# Patient Record
Sex: MALE
[De-identification: names, ages, dates, MRNs, and addresses within clinical notes are randomized per-mention and may not be internally consistent; named-entity substitution may affect disease eponyms.]

---

## 2022-09-01 PROBLEM — Z00.00 ENCOUNTER FOR PREVENTIVE HEALTH EXAMINATION: Status: ACTIVE | Noted: 2022-09-01

## 2022-09-19 ENCOUNTER — APPOINTMENT (OUTPATIENT)
Dept: PHYSICAL MEDICINE AND REHAB | Facility: CLINIC | Age: 27
End: 2022-09-19

## 2022-09-19 VITALS
RESPIRATION RATE: 18 BRPM | DIASTOLIC BLOOD PRESSURE: 71 MMHG | HEIGHT: 71 IN | HEART RATE: 72 BPM | SYSTOLIC BLOOD PRESSURE: 111 MMHG | BODY MASS INDEX: 25.2 KG/M2 | WEIGHT: 180 LBS

## 2022-09-19 DIAGNOSIS — Z78.9 OTHER SPECIFIED HEALTH STATUS: ICD-10-CM

## 2022-09-19 DIAGNOSIS — M79.18 MYALGIA, OTHER SITE: ICD-10-CM

## 2022-09-19 DIAGNOSIS — Z86.39 PERSONAL HISTORY OF OTHER ENDOCRINE, NUTRITIONAL AND METABOLIC DISEASE: ICD-10-CM

## 2022-09-19 DIAGNOSIS — M79.2 NEURALGIA AND NEURITIS, UNSPECIFIED: ICD-10-CM

## 2022-09-19 DIAGNOSIS — F19.90 OTHER PSYCHOACTIVE SUBSTANCE USE, UNSPECIFIED, UNCOMPLICATED: ICD-10-CM

## 2022-09-19 DIAGNOSIS — M25.811 OTHER SPECIFIED JOINT DISORDERS, RIGHT SHOULDER: ICD-10-CM
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
## 2022-09-19 DIAGNOSIS — M25.511 PAIN IN RIGHT SHOULDER: ICD-10-CM

## 2022-09-19 PROCEDURE — 99204 OFFICE O/P NEW MOD 45 MIN: CPT | Mod: 25

## 2022-09-19 PROCEDURE — 99072 ADDL SUPL MATRL&STAF TM PHE: CPT

## 2022-09-19 PROCEDURE — 20552 NJX 1/MLT TRIGGER POINT 1/2: CPT

## 2022-09-19 RX ORDER — DICLOFENAC SODIUM 1% 10 MG/G
1 GEL TOPICAL DAILY
Qty: 1 | Refills: 0 | Status: ACTIVE | COMMUNITY
Start: 2022-09-19 | End: 1900-01-01

## 2022-09-19 NOTE — REVIEW OF SYSTEMS
[Patient Intake Form Reviewed] : Patient intake form was reviewed [Muscle Pain] : muscle pain [Negative] : Heme/Lymph [Lower Ext Edema] : no lower extremity edema [Joint Pain] : no joint pain [Muscle Weakness] : no muscle weakness [Difficulty Walking] : no difficulty walking

## 2022-09-19 NOTE — ASSESSMENT
[FreeTextEntry1] : \par PLAN AND RECOMMENDATIONS :\par \par We discussed differential diagnosis and clinical impression\par combination of RSI overuse syndrome and myofascial spasm now with chronic shoulder pain \par reassured \par offered trigger point injection todays visit -he accepted -no complications ensued  \par Recommend\par .symptomatic care and support\par  medications cont nsaids as per PCP advise Voltaren gel topical nsaid\par  I advised ROBYN that the NSAID should be taken with food.  In addition while taking the prescribed NSAID, no over the counter or other NSAIDs should be used, such as ibuprofen (Motrin or Advil) or naproxen (Aleve) as this can cause stomach upset or other side effects.  If needed for fever or breakthrough pain Tylenol can be used.\par \par \par  imaging not needed \par  referral to PT \par  hydrotherapy /heat / cold for pain\par  continue precautions including care with bending, lifting, twisting and  carrying.\par \par  relative rest and avoidance of painful activity where possible \par  increasing activity as discussed \par  return for follow up 4-6 weeks \par

## 2022-09-19 NOTE — PROCEDURE
[de-identified] : Procedure: Trigger Point Injection - x 2 \par \par Injectate: A Total of 6mL consisting of 1 mL Dexamethasone (10mg/mL) and 5 mL 1% Lidocaine\par \par see clinical  for injection sites- R rhomboid \par \par After risks, benefits and alternatives were discussed, the patient gave informed consent. The trigger points were palpated to reproduce radiating pain and the areas were marked using a depression made by tip of the needle cap. Alcohol was used to sterilize the area.  A 25 gauge needle was used to inject the above mentioned injectate among the identified trigger points, the needle was moved in multiple directions to induce spasm in the muscle.  There was no blood in the hub on aspiration. The needle was redirected and the remaining solution was injected. After applying direct pressure over the area, and verifying appropriate hemostasis a band-aid was used to cover the injection site.  There were no complications during or after the procedure. The patient reported improvement in pain after procedure and was instructed to ice area as needed.\par

## 2022-09-19 NOTE — CONSULT LETTER
[FreeTextEntry1] : Dear Dr. NEHA LOUIE  , \par \par I had the pleasure of evaluating your patient, ROBYN ESPINOZA .\par \par Thank you very much for allowing me to participate in the care of this patient. If you have any questions, please do not hesitate to contact me. \par \par Sincerely, \par Aleksandra Renae MD \par \par ABPMR Board Certified in Physical Medicine and Rehabilitation\par Certified Fellow of AANEM (Neuromuscular and Electrodiagnostic Medicine)\par Subspecialty certified in Sports Medicine (ABPMR)\par \par  of Physical Medicine and Rehabilitation\par [facility] [employer] [facility]\par [facility]\par \par

## 2022-09-19 NOTE — HISTORY OF PRESENT ILLNESS
[FreeTextEntry1] : Mr. ROBYN ESPINOZA is a very pleasant 27 year male who seen for evaluation of ongoing R shoulder /neck pains   that has been ongoing for 3 months   without any specific injury or inciting event. 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 The pain is rated as 5/10 during today's visit, and ranges from 4-6/10. The patient's symptoms are aggravated by sleeping on that side certain positions   and alleviated by rest heat ice . The patient denies any night pain, numbness/tingling, weakness, or bowel/bladder dysfunction. The patient has no other complaints at this time.\par he tried acupuncture and some massages

## 2022-09-19 NOTE — PHYSICAL EXAM
[FreeTextEntry1] : PHYSICAL EXAM : OBJECTIVE \par \par GENERAL : Awake ,alert and oriented to time place and person \par HEAD : normocephalic and atraumatic \par NECK : supple ,no tracheal deviation ,no thyroid enlargement noted with swallowing\par EYES : sclera and conjunctiva normal no redness,intact extraocular movements \par ENT  : ears and nose normal in appearance -hearing adequate \par PULMONARY: effort normal. No respiratory distress. breathing regular. No wheezes \par LYMPH : No swelling in limbs, capillary return within normal range \par CVS : warm extremities,no palpitations,not short of breath, no visible jugular venous distention\par PSYCH : mood and affect normal ,good eye contact ,normal attention \par ABDOMEN : no visible distension , \par NEUROLOGICAL:cranial nerves intact,muscle tone normal,gait and balance safe except where noted below \par SKIN : warm and dry No rash detected over specific body areas examined \par MUSCULOSKELETAL: normal muscle bulk, no focal bony tenderness /posture normal except where specified below\par ROM R shoulder some mild impingement \par trigger pts x 2 R rhomboids \par No long tract signs found on clinical exam and no focal neurological deficits\par c spine ROM full \par gait NL \par reflexes 2 + \par